# Patient Record
Sex: FEMALE | Race: WHITE | NOT HISPANIC OR LATINO | Employment: OTHER | URBAN - NONMETROPOLITAN AREA
[De-identification: names, ages, dates, MRNs, and addresses within clinical notes are randomized per-mention and may not be internally consistent; named-entity substitution may affect disease eponyms.]

---

## 2017-07-27 ENCOUNTER — OUTSIDE FACILITY SERVICE (OUTPATIENT)
Dept: CARDIOLOGY | Facility: CLINIC | Age: 72
End: 2017-07-27

## 2017-07-27 PROCEDURE — 99205 OFFICE O/P NEW HI 60 MIN: CPT | Performed by: INTERNAL MEDICINE

## 2017-07-27 PROCEDURE — 93018 CV STRESS TEST I&R ONLY: CPT | Performed by: INTERNAL MEDICINE

## 2017-07-27 PROCEDURE — 78452 HT MUSCLE IMAGE SPECT MULT: CPT | Performed by: INTERNAL MEDICINE

## 2017-07-27 PROCEDURE — 93306 TTE W/DOPPLER COMPLETE: CPT | Performed by: INTERNAL MEDICINE

## 2017-08-24 ENCOUNTER — OFFICE VISIT (OUTPATIENT)
Dept: CARDIOLOGY | Facility: CLINIC | Age: 72
End: 2017-08-24

## 2017-08-24 VITALS
HEART RATE: 56 BPM | HEIGHT: 67 IN | DIASTOLIC BLOOD PRESSURE: 90 MMHG | WEIGHT: 169 LBS | BODY MASS INDEX: 26.53 KG/M2 | SYSTOLIC BLOOD PRESSURE: 156 MMHG

## 2017-08-24 DIAGNOSIS — E11.9 TYPE 2 DIABETES MELLITUS WITHOUT COMPLICATION, WITH LONG-TERM CURRENT USE OF INSULIN (HCC): ICD-10-CM

## 2017-08-24 DIAGNOSIS — Z79.4 TYPE 2 DIABETES MELLITUS WITHOUT COMPLICATION, WITH LONG-TERM CURRENT USE OF INSULIN (HCC): ICD-10-CM

## 2017-08-24 DIAGNOSIS — E78.00 HYPERCHOLESTEROLEMIA: ICD-10-CM

## 2017-08-24 DIAGNOSIS — I10 ESSENTIAL HYPERTENSION: Primary | ICD-10-CM

## 2017-08-24 PROCEDURE — 93000 ELECTROCARDIOGRAM COMPLETE: CPT | Performed by: NURSE PRACTITIONER

## 2017-08-24 PROCEDURE — 99214 OFFICE O/P EST MOD 30 MIN: CPT | Performed by: NURSE PRACTITIONER

## 2017-08-24 RX ORDER — MEMANTINE HYDROCHLORIDE 10 MG/1
10 TABLET ORAL 2 TIMES DAILY
COMMUNITY

## 2017-08-24 RX ORDER — DONEPEZIL HYDROCHLORIDE 10 MG/1
10 TABLET, FILM COATED ORAL NIGHTLY
COMMUNITY

## 2017-08-24 RX ORDER — INSULIN GLARGINE 100 [IU]/ML
14 INJECTION, SOLUTION SUBCUTANEOUS DAILY
COMMUNITY
End: 2019-08-26

## 2017-08-24 RX ORDER — NEBIVOLOL 5 MG/1
TABLET ORAL
COMMUNITY
End: 2018-08-27 | Stop reason: SDUPTHER

## 2017-08-24 NOTE — PROGRESS NOTES
Chief Complaint   Patient presents with   • Hospital follow up     Patient recently in hospital related to chest discomfort, pressure and tightness, had Lexiscan Myoview, echo, and medication changes. She reports feeling better. B/P fluctuates, reports most of the time B/P is stable at home yet has some fluctuation.       Subjective       Chaor Pollard is a 72 y.o. 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  She reported mid-sternal chest pain that radiated to her left arm, presented to the ER, and was found to have negative enzymes.  She underwent Lexiscan and echocardiogram which showed no ischemia and good LV function.  Symptoms were thought to be from dehydration and costochondritis.  She returned today for a hospital follow up visit doing much better.  She denies any further symptoms.  She has increased her fluid intake significantly.  Her  provides most of her care and reports bp runs 130's to 140's systolic and is usually never over 150.  Lipids while in the hospital were elevated with , Tri 114, HDL 36, . TSH 2.95.  Cr 1.5 at time of admission which came down to 1.1 at discharge.      HPI         Cardiac History:    Past Surgical History:   Procedure Laterality Date   • CARDIOVASCULAR STRESS TEST  07/27/2017    Lexiscan- no ischemia, EF 64%   • ECHO - CONVERTED  07/27/2017    [facility]- EF 60%, mild MR, RVSP 33 mmHg       Current Outpatient Prescriptions   Medication Sig Dispense Refill   • donepezil (ARICEPT) 10 MG tablet Take 10 mg by mouth Every Night.     • insulin glargine (LANTUS) 100 UNIT/ML injection Inject 14 Units under the skin Daily.     • memantine (NAMENDA) 10 MG tablet Take 10 mg by mouth 2 (Two) Times a Day.     • nebivolol (BYSTOLIC) 5 MG tablet 1/2 tablet once daily     • pravastatin (PRAVACHOL) 20 MG tablet Take 1 tablet by mouth  "Every Night. 90 tablet 3     No current facility-administered medications for this visit.        Lipitor [atorvastatin]    Past Medical History:   Diagnosis Date   • Acute kidney injury     secondary to poor po intake.   • Dementia    • HTN (hypertension)    • Hypercholesterolemia    • Insulin-requiring or dependent type II diabetes mellitus    • S/P breast biopsy    • S/P hysterectomy        Social History     Social History   • Marital status:      Spouse name: N/A   • Number of children: N/A   • Years of education: N/A     Occupational History   • Not on file.     Social History Main Topics   • Smoking status: Never Smoker   • Smokeless tobacco: Never Used   • Alcohol use No   • Drug use: No   • Sexual activity: Not on file     Other Topics Concern   • Not on file     Social History Narrative       Family History   Problem Relation Age of Onset   • Diabetes Father    • Colon cancer Father    • Heart disease Brother    • Hypertension Brother    • Hypertension Daughter    • No Known Problems Daughter    • No Known Problems Daughter        Review of Systems   Constitution: Negative for decreased appetite, malaise/fatigue and weight loss.   HENT: Negative for congestion and headaches.    Cardiovascular: Negative for chest pain, dyspnea on exertion, leg swelling and palpitations.   Respiratory: Negative for cough and shortness of breath.    Hematologic/Lymphatic: Negative for bleeding problem. Does not bruise/bleed easily.   Musculoskeletal: Negative for muscle weakness and myalgias.   Gastrointestinal: Negative for abdominal pain, constipation, diarrhea, nausea and vomiting.   Neurological: Negative for dizziness, light-headedness and numbness.   Psychiatric/Behavioral: Positive for memory loss (r/t dememtia). Negative for depression.        Diabetes- Yes  Thyroid-normal    Objective     /90  Pulse 56  Ht 67\" (170.2 cm)  Wt 169 lb (76.7 kg)  BMI 26.47 kg/m2    Physical Exam   Constitutional: She is " oriented to person, place, and time. She appears well-developed and well-nourished. No distress.   HENT:   Head: Normocephalic and atraumatic.   Eyes: Pupils are equal, round, and reactive to light. No scleral icterus.   Neck: Normal range of motion. Neck supple. No thyromegaly present.   Cardiovascular: Normal rate and regular rhythm.    Pulmonary/Chest: Effort normal and breath sounds normal. No respiratory distress. She has no wheezes.   Abdominal: Soft. Bowel sounds are normal. She exhibits no distension. There is no tenderness.   Musculoskeletal: Normal range of motion. She exhibits no edema.   Neurological: She is alert and oriented to person, place, and time.   Skin: Skin is warm and dry. No pallor.   Psychiatric: She has a normal mood and affect. Cognition and memory are impaired.   Nursing note and vitals reviewed.           ECG 12 Lead  Date/Time: 8/25/2017 9:05 AM  Performed by: JOSSIE HUNTER  Authorized by: JOSSIE HUNTER   Previous ECG: no previous ECG available  Rhythm: sinus rhythm and sinus bradycardia  Rate: bradycardic  BPM: 58  Conduction: conduction normal  ST Segments: ST segments normal  T Waves: T waves normal  Comments: Sinus junie- 58 BPM  No acute changes  KY interval 179 ms  QTc 423 ms                  Assessment/Plan      Her blood pressure is slightly elevated today.  Heart rate is stable.  EKG done today shows no acute changes.  We discussed maintaining BP <150/90 and her  assured it is less than this at home.  I explained the findings from her cardiac work up.  Since she is asymptomatic, we will continue to monitor her.  With her diabetes and elevated LDL, she might benefit from a low dose statin. She has been intolerant to Lipitor in the past.  We will try pravastatin 20 mg qhs.  We will see her back in six months for follow up or sooner if problems arise.      Charo was seen today for hospital follow up.    Diagnoses and all orders for this visit:    Essential hypertension  -      ECG 12 Lead    Hypercholesterolemia    Type 2 diabetes mellitus without complication, with long-term current use of insulin                    Electronically signed by NYDIA Jimenez,  August 28, 2017 4:26 PM

## 2017-08-25 ENCOUNTER — TELEPHONE (OUTPATIENT)
Dept: CARDIOLOGY | Facility: CLINIC | Age: 72
End: 2017-08-25

## 2017-08-25 NOTE — TELEPHONE ENCOUNTER
Pt was seen yesterday for hosp fu.  We discussed adding low dose statin at visit but wanted to discuss with Dr. Spencer first.  He agrees and we would like to add pravastatin 20 mg QHS.  She has atorvastatin listed as an allergy.  Can we clarify with pt's  what kind of allergy?  If only mild myalgia, then we can try pravastatin.  If true allergy or elevated LFT, then we will not add statin.    Thank you.

## 2017-08-28 RX ORDER — PRAVASTATIN SODIUM 20 MG
20 TABLET ORAL NIGHTLY
Qty: 90 TABLET | Refills: 3 | Status: SHIPPED | OUTPATIENT
Start: 2017-08-28 | End: 2017-08-28 | Stop reason: SDUPTHER

## 2017-08-28 RX ORDER — PRAVASTATIN SODIUM 20 MG
20 TABLET ORAL NIGHTLY
Qty: 90 TABLET | Refills: 3 | Status: SHIPPED | OUTPATIENT
Start: 2017-08-28 | End: 2018-02-26 | Stop reason: ALTCHOICE

## 2018-02-26 ENCOUNTER — OFFICE VISIT (OUTPATIENT)
Dept: CARDIOLOGY | Facility: CLINIC | Age: 73
End: 2018-02-26

## 2018-02-26 VITALS
SYSTOLIC BLOOD PRESSURE: 152 MMHG | HEART RATE: 60 BPM | BODY MASS INDEX: 27.62 KG/M2 | DIASTOLIC BLOOD PRESSURE: 90 MMHG | HEIGHT: 67 IN | WEIGHT: 176 LBS

## 2018-02-26 DIAGNOSIS — I10 ESSENTIAL HYPERTENSION: Primary | ICD-10-CM

## 2018-02-26 DIAGNOSIS — R41.3 MEMORY LOSS: ICD-10-CM

## 2018-02-26 DIAGNOSIS — E11.9 DIABETES MELLITUS WITH INSULIN THERAPY (HCC): ICD-10-CM

## 2018-02-26 DIAGNOSIS — Z79.4 DIABETES MELLITUS WITH INSULIN THERAPY (HCC): ICD-10-CM

## 2018-02-26 PROCEDURE — 99213 OFFICE O/P EST LOW 20 MIN: CPT | Performed by: NURSE PRACTITIONER

## 2018-02-26 NOTE — PROGRESS NOTES
Chief Complaint   Patient presents with   • Follow-up     For cardiac management. Last lab work was done in [facility] in 07/27/17, in chart.    • Med Refill     PCP does medication refills.       Cardiac Complaints  none      Subjective   Charo Pollard is a 72 y.o. 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  She reported mid-sternal chest pain that radiated to her left arm, presented to the ER, and was found to have negative enzymes.  She underwent Lexiscan and echocardiogram which showed no ischemia and good LV function.  Symptoms were thought to be from dehydration and costochondritis.  She returns today accompanied by her , who gives most of her information as she is not a good historian as currently battling dementia.  Labs are done with PCP, but most recent were done in the hospital in July.  No refills needed as they are done with PCP.      Cardiac History  Past Surgical History:   Procedure Laterality Date   • CARDIOVASCULAR STRESS TEST  07/27/2017    Lexiscan- no ischemia, EF 64%   • ECHO - CONVERTED  07/27/2017    [facility]- EF 60%, mild MR, RVSP 33 mmHg       Current Outpatient Prescriptions   Medication Sig Dispense Refill   • donepezil (ARICEPT) 10 MG tablet Take 10 mg by mouth Every Night.     • insulin glargine (LANTUS) 100 UNIT/ML injection Inject 14 Units under the skin Daily.     • memantine (NAMENDA) 10 MG tablet Take 10 mg by mouth 2 (Two) Times a Day.     • nebivolol (BYSTOLIC) 5 MG tablet 1/2 tablet once daily       No current facility-administered medications for this visit.        Lipitor [atorvastatin]    Past Medical History:   Diagnosis Date   • Acute kidney injury     secondary to poor po intake.   • Dementia    • HTN (hypertension)    • Hypercholesterolemia    • Insulin-requiring or dependent type II diabetes mellitus    • S/P breast biopsy    • S/P  "hysterectomy        Social History     Social History   • Marital status:      Spouse name: N/A   • Number of children: N/A   • Years of education: N/A     Occupational History   • Not on file.     Social History Main Topics   • Smoking status: Never Smoker   • Smokeless tobacco: Never Used   • Alcohol use No   • Drug use: No   • Sexual activity: Not on file     Other Topics Concern   • Not on file     Social History Narrative       Family History   Problem Relation Age of Onset   • Diabetes Father    • Colon cancer Father    • Heart disease Brother    • Hypertension Brother    • Hypertension Daughter    • No Known Problems Daughter    • No Known Problems Daughter        Review of Systems   Constitution: Negative for weakness and malaise/fatigue.   Cardiovascular: Negative for chest pain, dyspnea on exertion, leg swelling, orthopnea, palpitations and syncope.   Respiratory: Negative for cough, shortness of breath and wheezing.    Musculoskeletal: Negative for back pain, joint pain and joint swelling.   Gastrointestinal: Negative for anorexia, heartburn and nausea.   Genitourinary: Negative for dysuria, hesitancy and nocturia.   Neurological: Negative for dizziness, light-headedness, loss of balance and numbness.   Psychiatric/Behavioral: Negative for depression and memory loss. The patient is not nervous/anxious.        DiabetesYes  Thyroidnormal    Objective     /90 (BP Location: Left arm)  Pulse 60  Ht 170.2 cm (67.01\")  Wt 79.8 kg (176 lb)  BMI 27.56 kg/m2    Physical Exam   Constitutional: She is oriented to person, place, and time. She appears well-developed and well-nourished.   HENT:   Head: Normocephalic and atraumatic.   Eyes: EOM are normal. Pupils are equal, round, and reactive to light.   Neck: Normal range of motion. Neck supple.   Cardiovascular: Normal rate and regular rhythm.    Murmur heard.  Pulmonary/Chest: Effort normal and breath sounds normal.   Abdominal: Soft. "   Musculoskeletal: Normal range of motion.   Neurological: She is alert and oriented to person, place, and time.   Skin: Skin is warm and dry.   Psychiatric: She has a normal mood and affect. Her behavior is normal.       Procedures    Assessment/Plan     HR is stable.  BP is elevated at 150/90, but  who is accompanying her states at home it is well controlled.  He attributes to white coat syndrome.  Bystolic dosing will be left at 5mg daily at this time for hypertension management. For now, we will not recommend any cardiac workup as she remains asymptomatic and is able to move around house without concerns. It was mentioned to add pravastatin at last visit for hyperlipidemia management, but patient could not tolerate and is managing with diet alone.   For diabetes management, her  reports she had forgotten insulin therapy for about a month but is now taking routinely once again and is doing better in regards.   reports doing well at home and is doing things without problems.  Labs are done with your office and  reports she will be following with you soon.  No refills needed as they are done with you also.  6 month follow up advised or sooner if needed.  BMI is slightly elevated at 27, we will continue to monitor.  Good cardiac diet encouraged.           Problems Addressed this Visit        Cardiovascular and Mediastinum    Essential hypertension - Primary       Other    Memory loss      Other Visit Diagnoses     Diabetes mellitus with insulin therapy              Patient's BMI is above normal parameters. Follow-up plan includes:  nutrition counseling.        Electronically signed by NYDIA Larson February 26, 2018 3:44 PM

## 2018-08-27 ENCOUNTER — OFFICE VISIT (OUTPATIENT)
Dept: CARDIOLOGY | Facility: CLINIC | Age: 73
End: 2018-08-27

## 2018-08-27 VITALS
WEIGHT: 174 LBS | BODY MASS INDEX: 27.31 KG/M2 | DIASTOLIC BLOOD PRESSURE: 82 MMHG | SYSTOLIC BLOOD PRESSURE: 134 MMHG | HEIGHT: 67 IN | HEART RATE: 64 BPM

## 2018-08-27 DIAGNOSIS — E66.3 OVERWEIGHT: ICD-10-CM

## 2018-08-27 DIAGNOSIS — Z79.4 TYPE 2 DIABETES MELLITUS WITHOUT COMPLICATION, WITH LONG-TERM CURRENT USE OF INSULIN (HCC): ICD-10-CM

## 2018-08-27 DIAGNOSIS — E11.9 TYPE 2 DIABETES MELLITUS WITHOUT COMPLICATION, WITH LONG-TERM CURRENT USE OF INSULIN (HCC): ICD-10-CM

## 2018-08-27 DIAGNOSIS — E78.00 PURE HYPERCHOLESTEROLEMIA: ICD-10-CM

## 2018-08-27 DIAGNOSIS — I10 ESSENTIAL HYPERTENSION: Primary | ICD-10-CM

## 2018-08-27 DIAGNOSIS — F02.80 ALZHEIMER'S DISEASE OF OTHER ONSET WITHOUT BEHAVIORAL DISTURBANCE: ICD-10-CM

## 2018-08-27 DIAGNOSIS — G30.8 ALZHEIMER'S DISEASE OF OTHER ONSET WITHOUT BEHAVIORAL DISTURBANCE: ICD-10-CM

## 2018-08-27 PROCEDURE — 99213 OFFICE O/P EST LOW 20 MIN: CPT | Performed by: NURSE PRACTITIONER

## 2018-08-27 RX ORDER — PRAVASTATIN SODIUM 20 MG
20 TABLET ORAL DAILY
COMMUNITY
End: 2018-09-04 | Stop reason: SDUPTHER

## 2018-08-27 RX ORDER — NEBIVOLOL 5 MG/1
TABLET ORAL
Qty: 90 TABLET | Refills: 2 | Status: SHIPPED | OUTPATIENT
Start: 2018-08-27 | End: 2018-08-30 | Stop reason: SDUPTHER

## 2018-08-27 NOTE — PROGRESS NOTES
Chief Complaint   Patient presents with   • Follow-up     For cardiac management. Patient is not on aspirin. Last lab work was unknown.   • Med Refill     Needs refills on cardiac medications. 90 day supplys to [facility].        Cardiac Complaints  none      Subjective   Charo Pollard is a 73 y.o. 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  She reported mid-sternal chest pain that radiated to her left arm, presented to the ER, and was found to have negative enzymes.  She underwent Lexiscan and echocardiogram which showed no ischemia and good LV function.  Symptoms were thought to be from dehydration and costochondritis. Patient returns today for follow up accompanied by her  who often helps with history as patient continues to wylie dementia/memory loss.  Patient and her  both state she has been doing well.  Symptoms of chest pain, shortness of breath, palpitations, and dizziness are denied.  Labs her  reports are done with PCP but he can not remember the last time they were checked.  Refills of bystolic requested for 90 day supply.          Cardiac History  Past Surgical History:   Procedure Laterality Date   • CARDIOVASCULAR STRESS TEST  07/27/2017    Lexiscan- no ischemia, EF 64%   • ECHO - CONVERTED  07/27/2017    [facility]- EF 60%, mild MR, RVSP 33 mmHg       Current Outpatient Prescriptions   Medication Sig Dispense Refill   • donepezil (ARICEPT) 10 MG tablet Take 10 mg by mouth Every Night.     • insulin glargine (LANTUS) 100 UNIT/ML injection Inject 14 Units under the skin Daily.     • memantine (NAMENDA) 10 MG tablet Take 10 mg by mouth 2 (Two) Times a Day.     • nebivolol (BYSTOLIC) 5 MG tablet 1/2 to 1 tablet 90 tablet 2   • pravastatin (PRAVACHOL) 20 MG tablet Take 20 mg by mouth Daily.       No current facility-administered medications for this visit.   "      Lipitor [atorvastatin]    Past Medical History:   Diagnosis Date   • Acute kidney injury (CMS/HCC)     secondary to poor po intake.   • Dementia    • HTN (hypertension)    • Hypercholesterolemia    • Insulin-requiring or dependent type II diabetes mellitus (CMS/HCC)    • S/P breast biopsy    • S/P hysterectomy        Social History     Social History   • Marital status:      Spouse name: N/A   • Number of children: N/A   • Years of education: N/A     Occupational History   • Not on file.     Social History Main Topics   • Smoking status: Never Smoker   • Smokeless tobacco: Never Used   • Alcohol use No   • Drug use: No   • Sexual activity: Not on file     Other Topics Concern   • Not on file     Social History Narrative   • No narrative on file       Family History   Problem Relation Age of Onset   • Diabetes Father    • Colon cancer Father    • Heart disease Brother    • Hypertension Brother    • Hypertension Daughter    • No Known Problems Daughter    • No Known Problems Daughter        Review of Systems   Constitution: Negative for weakness and malaise/fatigue.   Cardiovascular: Negative for chest pain, dyspnea on exertion, irregular heartbeat, leg swelling, orthopnea, palpitations and syncope.   Respiratory: Negative for cough, shortness of breath and wheezing.    Musculoskeletal: Negative for back pain, joint pain and joint swelling.   Gastrointestinal: Negative for anorexia, heartburn, nausea and vomiting.   Genitourinary: Negative for dysuria, hematuria, hesitancy and nocturia.   Neurological: Negative for dizziness, light-headedness and loss of balance.   Psychiatric/Behavioral: Positive for memory loss. Negative for depression. The patient is not nervous/anxious.            Objective     /82 (BP Location: Left arm)   Pulse 64   Ht 170.2 cm (67.01\")   Wt 78.9 kg (174 lb)   BMI 27.25 kg/m²     Physical Exam   Constitutional: She is oriented to person, place, and time. She appears " well-developed and well-nourished.   HENT:   Head: Normocephalic and atraumatic.   Eyes: Pupils are equal, round, and reactive to light. EOM are normal.   Neck: Normal range of motion. Neck supple.   Cardiovascular: Normal rate and regular rhythm.    Murmur heard.  Pulmonary/Chest: Effort normal and breath sounds normal.   Abdominal: Soft.   Musculoskeletal: Normal range of motion.   Neurological: She is alert and oriented to person, place, and time.   Skin: Skin is warm and dry.   Psychiatric: She has a normal mood and affect. Her behavior is normal.       Procedures    Assessment/Plan     HR and BP are stable today.  No adjustment to current will be recommended for HTN management.  Refills of bystolic therapy will be sent per request.  For hyperlipidemia, she continues on pravachol therapy for management and tolerates well.  Patient's  urged to follow with you for lab management as he is unsure of last time they were checked with your office.  For DM she has continued on insulin injections.  No new cardiac workup will be recommended at this time as cardiac symptoms are denied.  BMI elevated at 27.25 but weight is down 2 pounds from last visit.   counseled for her to continue on good cardiac ADA diet and activity as tolerated.  6 month follow up scheduled or sooner if needed.        Problems Addressed this Visit        Cardiovascular and Mediastinum    Essential hypertension - Primary    Relevant Medications    nebivolol (BYSTOLIC) 5 MG tablet      Other Visit Diagnoses     Pure hypercholesterolemia        Relevant Medications    pravastatin (PRAVACHOL) 20 MG tablet    Type 2 diabetes mellitus without complication, with long-term current use of insulin (CMS/[facility])        Alzheimer's disease of other onset without behavioral disturbance        Overweight              Patient's Body mass index is 27.25 kg/m². BMI is above normal parameters. Recommendations include: nutrition  counseling.        Electronically signed by Estephanie Teran, NYDIA August 27, 2018 3:18 PM

## 2018-08-29 ENCOUNTER — TELEPHONE (OUTPATIENT)
Dept: CARDIOLOGY | Facility: CLINIC | Age: 73
End: 2018-08-29

## 2018-08-29 NOTE — TELEPHONE ENCOUNTER
Received fax from pharmacy requesting frequency on Bystolic. What is your recommendations? Thanks

## 2018-08-30 RX ORDER — NEBIVOLOL 5 MG/1
TABLET ORAL
Qty: 90 TABLET | Refills: 2 | Status: SHIPPED | OUTPATIENT
Start: 2018-08-30 | End: 2019-08-26

## 2018-09-05 RX ORDER — PRAVASTATIN SODIUM 20 MG
TABLET ORAL
Qty: 90 TABLET | Refills: 3 | Status: SHIPPED | OUTPATIENT
Start: 2018-09-05 | End: 2019-08-26 | Stop reason: SDUPTHER

## 2019-02-25 ENCOUNTER — OFFICE VISIT (OUTPATIENT)
Dept: CARDIOLOGY | Facility: CLINIC | Age: 74
End: 2019-02-25

## 2019-02-25 VITALS
HEIGHT: 67 IN | WEIGHT: 180 LBS | DIASTOLIC BLOOD PRESSURE: 80 MMHG | HEART RATE: 60 BPM | BODY MASS INDEX: 28.25 KG/M2 | SYSTOLIC BLOOD PRESSURE: 144 MMHG

## 2019-02-25 DIAGNOSIS — E11.9 TYPE 2 DIABETES MELLITUS WITHOUT COMPLICATION, WITH LONG-TERM CURRENT USE OF INSULIN (HCC): ICD-10-CM

## 2019-02-25 DIAGNOSIS — R41.3 MEMORY LOSS: ICD-10-CM

## 2019-02-25 DIAGNOSIS — Z79.4 TYPE 2 DIABETES MELLITUS WITHOUT COMPLICATION, WITH LONG-TERM CURRENT USE OF INSULIN (HCC): ICD-10-CM

## 2019-02-25 DIAGNOSIS — E78.2 MIXED HYPERLIPIDEMIA: ICD-10-CM

## 2019-02-25 DIAGNOSIS — I10 ESSENTIAL HYPERTENSION: Primary | ICD-10-CM

## 2019-02-25 PROBLEM — R07.89 OTHER CHEST PAIN: Status: ACTIVE | Noted: 2019-02-25

## 2019-02-25 PROCEDURE — 99213 OFFICE O/P EST LOW 20 MIN: CPT | Performed by: NURSE PRACTITIONER

## 2019-02-25 NOTE — PROGRESS NOTES
Chief Complaint   Patient presents with   • Follow-up     Cardiac management. She has had no recent labs. Reports B/P stable at home. She has took Pravastatin for the last 3 weeks due to pain in legs.   • Chest Pain     Having occasional aching pain to right chest with over exertion only.       Subjective       Charo Pollard is a 73 y.o. 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  She reported mid-sternal chest pain that radiated to her left arm, presented to the ER, and was found to have negative enzymes.  She underwent Lexiscan and echocardiogram which showed no ischemia and good LV function.  Symptoms were thought to be from dehydration and costochondritis. She came today for follow up with her . She feels well. Denies chest pain, shortness of breath, and palpitations. Her  provides close care as she suffers from significant memory loss. Labs are followed by Dr. Zeng. She is intolerant to statin and takes pravastatin as she can.      HPI         Cardiac History:    Past Surgical History:   Procedure Laterality Date   • CARDIOVASCULAR STRESS TEST  07/27/2017    Lexiscan- no ischemia, EF 64%   • [facility]  [facility], RVSP 33 mmHg       Current Outpatient Medications   Medication Sig Dispense Refill   • donepezil (ARICEPT) 10 MG tablet Take 10 mg by mouth Every Night.     • insulin glargine (LANTUS) 100 UNIT/ML injection Inject 14 Units under the skin Daily.     • memantine (NAMENDA) 10 MG tablet Take 10 mg by mouth 2 (Two) Times a Day.     • nebivolol (BYSTOLIC) 5 MG tablet 1/2 to 1 tablet by mouth daily (Patient taking differently: Take 2.5 mg by mouth Daily.) 90 tablet 2   • pravastatin (PRAVACHOL) 20 MG tablet TAKE 1 TABLET EVERY NIGHT 90 tablet 3     No current facility-administered medications for this visit.        Lipitor  [atorvastatin]    Past Medical History:   Diagnosis Date   • Acute kidney injury (CMS/HCC)     secondary to poor po intake.   • Dementia    • HTN (hypertension)    • Hypercholesterolemia    • Insulin-requiring or dependent type II diabetes mellitus (CMS/HCC)    • S/P breast biopsy    • S/P hysterectomy        Social History     Socioeconomic History   • Marital status:      Spouse name: Not on file   • Number of children: Not on file   • Years of education: Not on file   • Highest education level: Not on file   Social Needs   • Financial resource strain: Not on file   • Food insecurity - worry: Not on file   • Food insecurity - inability: Not on file   • Transportation needs - medical: Not on file   • Transportation needs - non-medical: Not on file   Occupational History   • Not on file   Tobacco Use   • Smoking status: Never Smoker   • Smokeless tobacco: Never Used   Substance and Sexual Activity   • Alcohol use: No   • Drug use: No   • Sexual activity: Not on file   Other Topics Concern   • Not on file   Social History Narrative   • Not on file       Family History   Problem Relation Age of Onset   • Diabetes Father    • Colon cancer Father    • Heart disease Brother    • Hypertension Brother    • Hypertension Daughter    • No Known Problems Daughter    • No Known Problems Daughter        Review of Systems   Constitution: Positive for weight gain (increased 6 lb ). Negative for decreased appetite, weakness and malaise/fatigue.   HENT: Negative.    Eyes: Negative.    Cardiovascular: Negative for chest pain, dyspnea on exertion, leg swelling (varicose veins ) and orthopnea.   Respiratory: Negative for cough and shortness of breath.    Endocrine: Negative.    Hematologic/Lymphatic: Negative.    Skin: Negative.    Musculoskeletal: Positive for myalgias. Negative for falls.   Gastrointestinal: Negative for bloating, abdominal pain and melena.   Genitourinary: Negative for dysuria and hematuria.   Neurological:  "Negative for dizziness.   Psychiatric/Behavioral: Positive for altered mental status and memory loss.   Allergic/Immunologic: Negative.         Diabetes- Yes  Thyroid-normal    Objective     /80 (BP Location: Right arm)   Pulse 60   Ht 170.2 cm (67.01\")   Wt 81.6 kg (180 lb)   BMI 28.19 kg/m²     Physical Exam   Constitutional: She is oriented to person, place, and time. She appears well-developed and well-nourished. No distress.   HENT:   Head: Normocephalic and atraumatic.   Eyes: Pupils are equal, round, and reactive to light.   Neck: Normal range of motion.   Cardiovascular: Normal rate, regular rhythm and intact distal pulses.   Pulmonary/Chest: Effort normal and breath sounds normal. No respiratory distress. She has no wheezes. She has no rales.   Abdominal: Soft. Bowel sounds are normal.   Musculoskeletal: Normal range of motion. She exhibits no edema (varicose veins, no edema ).   Neurological: She is alert and oriented to person, place, and time.   Skin: Skin is warm and dry. She is not diaphoretic.   Psychiatric: She has a normal mood and affect.   Nursing note and vitals reviewed.     Procedures          Assessment/Plan    Heart rate and blood pressure are well controlled with low dose of Bystolic. Continue the same. Clinically, she appears stable without significant cardiac complaints. Labs are followed with Dr. Zeng. Did not push statin therapy with her myalgia and memory loss. Take as she can tolerate. She has varicose veins without significant edema. She can use compression stockings for pressure relief, elevated legs while sitting, limit sodium intake. Diabetes management per Dr. Zeng. Blood sugars are well controlled. No new testing ordered today. She appears stable. We will see her back in six months or sooner if needed.    Charo was seen today for follow-up and chest pain.    Diagnoses and all orders for this visit:    Essential hypertension    Memory loss    Mixed " hyperlipidemia    Type 2 diabetes mellitus without complication, with long-term current use of insulin (CMS/[facility])        Patient's Body mass index is 28.19 kg/m². BMI is above normal parameters. Recommendations include: nutrition counseling.                     Electronically signed by NYDIA Jimenez,  February 25, 2019 2:34 PM

## 2019-08-12 RX ORDER — PRAVASTATIN SODIUM 20 MG
TABLET ORAL
Qty: 90 TABLET | Refills: 3 | OUTPATIENT
Start: 2019-08-12

## 2019-08-26 ENCOUNTER — OFFICE VISIT (OUTPATIENT)
Dept: CARDIOLOGY | Facility: CLINIC | Age: 74
End: 2019-08-26

## 2019-08-26 VITALS
SYSTOLIC BLOOD PRESSURE: 128 MMHG | HEIGHT: 67 IN | HEART RATE: 68 BPM | WEIGHT: 179.2 LBS | BODY MASS INDEX: 28.12 KG/M2 | DIASTOLIC BLOOD PRESSURE: 64 MMHG

## 2019-08-26 DIAGNOSIS — E78.2 MIXED HYPERLIPIDEMIA: ICD-10-CM

## 2019-08-26 DIAGNOSIS — E66.3 OVERWEIGHT (BMI 25.0-29.9): ICD-10-CM

## 2019-08-26 DIAGNOSIS — E11.9 TYPE 2 DIABETES MELLITUS WITHOUT COMPLICATION, WITH LONG-TERM CURRENT USE OF INSULIN (HCC): ICD-10-CM

## 2019-08-26 DIAGNOSIS — I10 ESSENTIAL HYPERTENSION: Primary | ICD-10-CM

## 2019-08-26 DIAGNOSIS — R41.3 MEMORY LOSS: ICD-10-CM

## 2019-08-26 DIAGNOSIS — Z79.4 TYPE 2 DIABETES MELLITUS WITHOUT COMPLICATION, WITH LONG-TERM CURRENT USE OF INSULIN (HCC): ICD-10-CM

## 2019-08-26 DIAGNOSIS — Z79.899 MEDICATION MANAGEMENT: ICD-10-CM

## 2019-08-26 PROCEDURE — 99214 OFFICE O/P EST MOD 30 MIN: CPT | Performed by: NURSE PRACTITIONER

## 2019-08-26 RX ORDER — PRAVASTATIN SODIUM 20 MG
20 TABLET ORAL NIGHTLY
Qty: 90 TABLET | Refills: 3 | Status: SHIPPED | OUTPATIENT
Start: 2019-08-26 | End: 2020-07-23

## 2019-08-26 RX ORDER — NEBIVOLOL 2.5 MG/1
2.5 TABLET ORAL DAILY
Qty: 90 TABLET | Refills: 3 | Status: SHIPPED | OUTPATIENT
Start: 2019-08-26 | End: 2020-05-11 | Stop reason: SDUPTHER

## 2019-08-26 RX ORDER — INSULIN GLARGINE 100 [IU]/ML
20 INJECTION, SOLUTION SUBCUTANEOUS DAILY
COMMUNITY

## 2019-08-26 NOTE — PROGRESS NOTES
Chief Complaint   Patient presents with   • Follow-up     Cardiac mangement . does not take Aspirin . No current labs on chart .Has no cardiac  complaints today . Has questions of possibly needing  labs , haven't had any drawn for a while   • Med Refill     Needs refills on Bystolic and Pravastatin  90 day supply [facility] mail order       Subjective       Charo Pollard is a 74 y.o. 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  She reported mid-sternal chest pain that radiated to her left arm, presented to the ER, and was found to have negative enzymes.  She underwent Lexiscan and echocardiogram which showed no ischemia and good LV function.  Symptoms were thought to be from dehydration and costochondritis.     Today she comes to the office for a follow-up visit accompanied by her .  He states she has been doing very well except concerned that her sugar may be running a little high at times.  No chest pain, increased shortness of breath, or near syncope noted.    HPI     Cardiac History:    Past Surgical History:   Procedure Laterality Date   • CARDIOVASCULAR STRESS TEST  07/27/2017    Lexiscan- no ischemia, EF 64%   • [facility]  [facility], RVSP 33 mmHg       Current Outpatient Medications   Medication Sig Dispense Refill   • donepezil (ARICEPT) 10 MG tablet Take 10 mg by mouth Every Night.     • insulin glargine (LANTUS) 100 UNIT/ML injection Inject 20 Units under the skin into the appropriate area as directed Daily.     • memantine (NAMENDA) 10 MG tablet Take 10 mg by mouth 2 (Two) Times a Day.     • pravastatin (PRAVACHOL) 20 MG tablet Take 1 tablet by mouth Every Night. 90 tablet 3   • insulin glargine (LANTUS) 100 UNIT/ML injection Inject 14 Units under the skin Daily.     • nebivolol (BYSTOLIC) 2.5 MG tablet Take 1 tablet by mouth Daily. 90  tablet 3     No current facility-administered medications for this visit.        Lipitor [atorvastatin]    Past Medical History:   Diagnosis Date   • Acute kidney injury (CMS/HCC)     secondary to poor po intake.   • Dementia    • HTN (hypertension)    • Hypercholesterolemia    • Insulin-requiring or dependent type II diabetes mellitus (CMS/HCC)    • S/P breast biopsy    • S/P hysterectomy        Social History     Socioeconomic History   • Marital status:      Spouse name: Not on file   • Number of children: Not on file   • Years of education: Not on file   • Highest education level: Not on file   Tobacco Use   • Smoking status: Never Smoker   • Smokeless tobacco: Never Used   Substance and Sexual Activity   • Alcohol use: No   • Drug use: No       Family History   Problem Relation Age of Onset   • Diabetes Father    • Colon cancer Father    • Heart disease Brother    • Hypertension Brother    • Hypertension Daughter    • No Known Problems Daughter    • No Known Problems Daughter        Review of Systems   Constitution: Negative for decreased appetite and weakness.   HENT: Negative for congestion, hoarse voice, nosebleeds and sore throat.    Eyes: Negative for blurred vision and redness.   Cardiovascular: Negative for chest pain, leg swelling, near-syncope and palpitations.   Respiratory: Negative for cough and shortness of breath.    Endocrine: Negative for cold intolerance and heat intolerance.   Hematologic/Lymphatic: Does not bruise/bleed easily.   Skin: Negative for color change, dry skin and itching.   Musculoskeletal: Negative for joint pain (right knee, not a new problem), muscle cramps and myalgias.   Gastrointestinal: Negative for abdominal pain, change in bowel habit, dysphagia, melena and nausea.   Genitourinary: Negative for dysuria and hematuria.   Neurological: Positive for difficulty with concentration. Negative for disturbances in coordination and loss of balance.   Psychiatric/Behavioral:  "Positive for memory loss. The patient does not have insomnia.         Objective     /64 (BP Location: Left arm)   Pulse 68   Ht 170.2 cm (67.01\")   Wt 81.3 kg (179 lb 3.2 oz)   BMI 28.06 kg/m²     Physical Exam   Constitutional: She is oriented to person, place, and time. She appears well-nourished.   HENT:   Head: Normocephalic.   Eyes: Conjunctivae are normal. Pupils are equal, round, and reactive to light.   Neck: Normal range of motion. Neck supple. Carotid bruit is not present.   Cardiovascular: Normal rate, regular rhythm, S1 normal and S2 normal.   No murmur heard.  Pulmonary/Chest: Breath sounds normal. She has no wheezes. She has no rales.   Abdominal: Soft. Bowel sounds are normal. There is no tenderness.   Musculoskeletal: Normal range of motion. She exhibits no edema or tenderness.   Neurological: She is alert and oriented to person, place, and time.   Skin: Skin is warm and dry.   Psychiatric: Her speech is normal. Her mood appears not anxious. She is not agitated. Cognition and memory are impaired. She does not exhibit a depressed mood.        Procedures: none today         Assessment/Plan      Charo was seen today for follow-up and med refill.    Diagnoses and all orders for this visit:    Essential hypertension  -     CBC (No Diff); Future    Mixed hyperlipidemia  -     Comprehensive Metabolic Panel; Future  -     Lipid Panel; Future    Type 2 diabetes mellitus without complication, with long-term current use of insulin (CMS/[facility])  -     Hemoglobin A1c; Future  -     Comprehensive Metabolic Panel; Future    Memory loss  -     TSH; Future    Medication management  -     TSH; Future  -     Hemoglobin A1c; Future  -     Comprehensive Metabolic Panel; Future  -     Lipid Panel; Future  -     CBC (No Diff); Future    Overweight (BMI 25.0-29.9)    Other orders  -     pravastatin (PRAVACHOL) 20 MG tablet; Take 1 tablet by mouth Every Night.  -     nebivolol (BYSTOLIC) 2.5 MG tablet; Take 1 tablet " by mouth Daily.    HTN- bp normal. Continue same dose Bystolic, refills given.     Mixed hyperlipidemia- continues Pravachol without issue.  Refills given.  Lab order given to reassess lipid panel and LFT as noted above.    Diabetes- she follows with you for management.  A lab order given which includes A1c.  I have requested a copy of labs to be sent to you also.     Overweight-patient's Body mass index is 28.06 kg/m². BMI is above normal parameters. Recommendations include: nutrition counseling.  Diabetic diet encouraged.    Memory loss- continues Aricept without problem.  She has good family support with normal ADLs.  No safety issues noted.    Medication management- no med changes made at this time.  Lab order given as noted above.    Further recommendations based on lab results.  We will have her return in 1 year for a follow-up visit.  Please call should any cardiac symptoms or concerns develop sooner.              Electronically signed by NYDIA Morris,  August 26, 2019 9:21 AM

## 2020-02-22 ENCOUNTER — RESULTS ENCOUNTER (OUTPATIENT)
Dept: CARDIOLOGY | Facility: CLINIC | Age: 75
End: 2020-02-22

## 2020-02-22 DIAGNOSIS — E78.2 MIXED HYPERLIPIDEMIA: ICD-10-CM

## 2020-02-22 DIAGNOSIS — Z79.899 MEDICATION MANAGEMENT: ICD-10-CM

## 2020-02-22 DIAGNOSIS — I10 ESSENTIAL HYPERTENSION: ICD-10-CM

## 2020-02-22 DIAGNOSIS — Z79.4 TYPE 2 DIABETES MELLITUS WITHOUT COMPLICATION, WITH LONG-TERM CURRENT USE OF INSULIN (HCC): ICD-10-CM

## 2020-02-22 DIAGNOSIS — R41.3 MEMORY LOSS: ICD-10-CM

## 2020-02-22 DIAGNOSIS — E11.9 TYPE 2 DIABETES MELLITUS WITHOUT COMPLICATION, WITH LONG-TERM CURRENT USE OF INSULIN (HCC): ICD-10-CM

## 2020-05-11 ENCOUNTER — TELEPHONE (OUTPATIENT)
Dept: CARDIOLOGY | Facility: CLINIC | Age: 75
End: 2020-05-11

## 2020-05-11 RX ORDER — NEBIVOLOL 2.5 MG/1
2.5 TABLET ORAL DAILY
Qty: 90 TABLET | Refills: 1 | Status: SHIPPED | OUTPATIENT
Start: 2020-05-11

## 2020-05-11 NOTE — TELEPHONE ENCOUNTER
Patient requesting refills on bystolic 2.5mg daily. 90 day with one refill on Bystolic 2.5mg daily sent to pharmacy.

## 2020-07-23 RX ORDER — PRAVASTATIN SODIUM 20 MG
TABLET ORAL
Qty: 90 TABLET | Refills: 3 | Status: SHIPPED | OUTPATIENT
Start: 2020-07-23

## 2020-11-12 RX ORDER — NEBIVOLOL 2.5 MG/1
2.5 TABLET ORAL DAILY
Qty: 30 TABLET | Refills: 0 | OUTPATIENT
Start: 2020-11-12

## 2020-11-12 NOTE — TELEPHONE ENCOUNTER
Patient has dementia. Please advise pharmacy to call her PCP, Dr. Zeng, for refills as he manages labs etc.

## 2020-11-12 NOTE — TELEPHONE ENCOUNTER
Phoned [facility] pharmacy mail delivery spoke with Jacklyn concerning routing refills to PCP, Jacklyn to route refills to PCP.

## 2021-04-16 RX ORDER — PRAVASTATIN SODIUM 20 MG
TABLET ORAL
Qty: 90 TABLET | Refills: 3 | OUTPATIENT
Start: 2021-04-16